# Patient Record
Sex: FEMALE | Race: WHITE | NOT HISPANIC OR LATINO | URBAN - METROPOLITAN AREA
[De-identification: names, ages, dates, MRNs, and addresses within clinical notes are randomized per-mention and may not be internally consistent; named-entity substitution may affect disease eponyms.]

---

## 2017-03-24 ENCOUNTER — OFFICE VISIT - RIVER FALLS (OUTPATIENT)
Dept: FAMILY MEDICINE | Facility: CLINIC | Age: 22
End: 2017-03-24

## 2017-03-24 ASSESSMENT — MIFFLIN-ST. JEOR: SCORE: 1488.41

## 2017-10-09 ENCOUNTER — OFFICE VISIT - RIVER FALLS (OUTPATIENT)
Dept: FAMILY MEDICINE | Facility: CLINIC | Age: 22
End: 2017-10-09

## 2017-10-09 ASSESSMENT — MIFFLIN-ST. JEOR: SCORE: 1468.45

## 2018-03-20 ENCOUNTER — OFFICE VISIT - RIVER FALLS (OUTPATIENT)
Dept: FAMILY MEDICINE | Facility: CLINIC | Age: 23
End: 2018-03-20

## 2018-03-20 ASSESSMENT — MIFFLIN-ST. JEOR: SCORE: 1466.64

## 2019-01-22 ENCOUNTER — OFFICE VISIT - RIVER FALLS (OUTPATIENT)
Dept: FAMILY MEDICINE | Facility: CLINIC | Age: 24
End: 2019-01-22

## 2019-01-22 ENCOUNTER — COMMUNICATION - RIVER FALLS (OUTPATIENT)
Dept: FAMILY MEDICINE | Facility: CLINIC | Age: 24
End: 2019-01-22

## 2019-01-22 LAB
CLUE CELLS: PRESENT
HCG UR QL: NEGATIVE
TRICHOMONAS, WET PREP: NORMAL
YEAST, WET PREP: NORMAL

## 2019-01-22 ASSESSMENT — MIFFLIN-ST. JEOR: SCORE: 1487.51

## 2019-01-24 ENCOUNTER — OFFICE VISIT - RIVER FALLS (OUTPATIENT)
Dept: FAMILY MEDICINE | Facility: CLINIC | Age: 24
End: 2019-01-24

## 2019-01-24 LAB
CHLAMYDIA TRACHOMATIS RNA, TMA - QUEST: NOT DETECTED
NEISSERIA GONORRHOEAE RNA TMA: NOT DETECTED

## 2019-05-08 ENCOUNTER — OFFICE VISIT - RIVER FALLS (OUTPATIENT)
Dept: FAMILY MEDICINE | Facility: CLINIC | Age: 24
End: 2019-05-08

## 2019-05-08 ASSESSMENT — MIFFLIN-ST. JEOR: SCORE: 1496.58

## 2019-07-18 ENCOUNTER — OFFICE VISIT - RIVER FALLS (OUTPATIENT)
Dept: FAMILY MEDICINE | Facility: CLINIC | Age: 24
End: 2019-07-18

## 2019-07-18 LAB — HCG UR QL: NEGATIVE

## 2019-07-18 ASSESSMENT — MIFFLIN-ST. JEOR: SCORE: 1394.97

## 2019-07-19 ENCOUNTER — COMMUNICATION - RIVER FALLS (OUTPATIENT)
Dept: FAMILY MEDICINE | Facility: CLINIC | Age: 24
End: 2019-07-19

## 2019-07-19 LAB
PROLACTIN: 12.1 NG/ML
TSH SERPL DL<=0.005 MIU/L-ACNC: 0.96 MIU/L

## 2019-08-08 ENCOUNTER — OFFICE VISIT - RIVER FALLS (OUTPATIENT)
Dept: FAMILY MEDICINE | Facility: CLINIC | Age: 24
End: 2019-08-08

## 2019-08-08 ASSESSMENT — MIFFLIN-ST. JEOR: SCORE: 1409.49

## 2019-09-18 ENCOUNTER — OFFICE VISIT - RIVER FALLS (OUTPATIENT)
Dept: FAMILY MEDICINE | Facility: CLINIC | Age: 24
End: 2019-09-18

## 2019-09-18 ASSESSMENT — MIFFLIN-ST. JEOR: SCORE: 1421.74

## 2019-09-27 ENCOUNTER — OFFICE VISIT - RIVER FALLS (OUTPATIENT)
Dept: FAMILY MEDICINE | Facility: CLINIC | Age: 24
End: 2019-09-27

## 2019-09-27 ASSESSMENT — MIFFLIN-ST. JEOR: SCORE: 1430.81

## 2019-11-04 ENCOUNTER — COMMUNICATION - RIVER FALLS (OUTPATIENT)
Dept: FAMILY MEDICINE | Facility: CLINIC | Age: 24
End: 2019-11-04

## 2022-02-12 VITALS
DIASTOLIC BLOOD PRESSURE: 66 MMHG | HEIGHT: 67 IN | SYSTOLIC BLOOD PRESSURE: 122 MMHG | DIASTOLIC BLOOD PRESSURE: 82 MMHG | BODY MASS INDEX: 22.6 KG/M2 | TEMPERATURE: 97.5 F | HEART RATE: 76 BPM | BODY MASS INDEX: 22.29 KG/M2 | HEART RATE: 76 BPM | HEIGHT: 67 IN | SYSTOLIC BLOOD PRESSURE: 124 MMHG | TEMPERATURE: 100.4 F | OXYGEN SATURATION: 98 % | WEIGHT: 142 LBS | WEIGHT: 144 LBS

## 2022-02-12 VITALS
BODY MASS INDEX: 25.04 KG/M2 | SYSTOLIC BLOOD PRESSURE: 107 MMHG | TEMPERATURE: 97.8 F | HEART RATE: 62 BPM | DIASTOLIC BLOOD PRESSURE: 64 MMHG | WEIGHT: 155.8 LBS | HEIGHT: 66 IN

## 2022-02-12 VITALS
SYSTOLIC BLOOD PRESSURE: 118 MMHG | TEMPERATURE: 99.2 F | DIASTOLIC BLOOD PRESSURE: 78 MMHG | WEIGHT: 160.2 LBS | HEART RATE: 74 BPM | HEIGHT: 66 IN | BODY MASS INDEX: 25.75 KG/M2

## 2022-02-12 VITALS
SYSTOLIC BLOOD PRESSURE: 120 MMHG | HEIGHT: 66 IN | WEIGHT: 160 LBS | HEART RATE: 60 BPM | BODY MASS INDEX: 25.71 KG/M2 | DIASTOLIC BLOOD PRESSURE: 74 MMHG | TEMPERATURE: 97 F

## 2022-02-12 VITALS
HEIGHT: 66 IN | TEMPERATURE: 97.9 F | HEART RATE: 80 BPM | SYSTOLIC BLOOD PRESSURE: 108 MMHG | BODY MASS INDEX: 24.98 KG/M2 | DIASTOLIC BLOOD PRESSURE: 70 MMHG | WEIGHT: 155.4 LBS

## 2022-02-12 VITALS
HEIGHT: 66 IN | BODY MASS INDEX: 22.43 KG/M2 | SYSTOLIC BLOOD PRESSURE: 120 MMHG | WEIGHT: 139.6 LBS | HEART RATE: 64 BPM | HEIGHT: 66 IN | HEART RATE: 76 BPM | DIASTOLIC BLOOD PRESSURE: 78 MMHG | DIASTOLIC BLOOD PRESSURE: 60 MMHG | BODY MASS INDEX: 22.95 KG/M2 | WEIGHT: 142.8 LBS | TEMPERATURE: 97.9 F | SYSTOLIC BLOOD PRESSURE: 102 MMHG | TEMPERATURE: 97.1 F

## 2022-02-12 VITALS
TEMPERATURE: 98.2 F | HEART RATE: 56 BPM | HEIGHT: 66 IN | SYSTOLIC BLOOD PRESSURE: 112 MMHG | DIASTOLIC BLOOD PRESSURE: 70 MMHG | WEIGHT: 162 LBS | BODY MASS INDEX: 26.03 KG/M2

## 2022-02-15 NOTE — PROGRESS NOTES
Patient:   BARBI MENDEZ            MRN: [id number]            FIN: [id number]               Age:   23 years     Sex:  Female     :  1995   Associated Diagnoses:   Asthma, mild intermittent; Breast discharge; Eustachian tube disorder   Author:   Keaton BUSTILLOS, Nadiya      Chief Complaint   2019 8:02 AM CDT    Here for f/u chest and bilat l>r Using nasal spray on occasion.        History of Present Illness   here in f/u of a couple things  1. Seen for eusta. tube dysfunction 6 weeks ago, use Atrovent, seems better. Does have intermit alternating ear pain. Had problems with a new piercing but feels that ear (R) is improved, pain now in left ear when she pulls on the ear.  Has a little sore throat. Hx of some allergens that trigger asthma otherwise never needs albuterol inhaler. She is agreeable to flu shot today, see ACT score, excellent. See AAP    2.  I saw her last summer for left nipple discharge, states that has resolved. She had normal lab work then and was asked to follow up in 6 weeks, cousin age 30 with metastatic breast cancer      Health Status   Allergies:    Allergic Reactions (Selected)  No Known Medication Allergies   Medications:  (Selected)   Prescriptions  Prescribed  Atrovent 42 mcg/inh nasal spray: 2 spray(s), nasal, qid, PRN: for nasal congestion, # 1 EA, 2 Refill(s), Type: Maintenance, Pharmacy: [facility], 2 spray(s) Nasal qid,PRN:for nasal congestion  NuvaRing 0.120 mg-0.015 mg/24 hours vaginal ring: See Instructions, Instructions: cycle continuously replacing every 4 weeks, # 3 EA, 4 Refill(s), Type: Maintenance, Pharmacy: [facility], cycle continuously replacing every 4 weeks  albuterol 90 mcg/inh inhalation aerosol: See Instructions, Instructions: 4 puffs with chamber every 4 hours as needed, # 1 EA, 1 Refill(s), Type: Maintenance, Pharmacy: [facility], 4 puffs with chamber every 4 hours as needed  chamber  "with valve and mouthpiece such as aerochamber: chamber with valve and mouthpiece such as aerochamber, See Instructions, Instructions: Use with albuterol, Supply, # 1 EA, 0 Refill(s), Type: Maintenance, Pharm[facility]: [id number], Use with albuterol   Problem list:    All Problems  History of chicken pox / SNOMED CT 239781184 / Confirmed      Histories   Past Medical History:    Active  History of chicken pox (246282376)   Family History:    Anxiety....  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  Comments:  5/8/2019 11:26 AM CDT - Consuelo JONATHAN Laina  as an infant  Extraction of wisdom tooth (SNOMED CT 461097526).  Comments:  5/15/2019 8:43 AM CDT - Maddie Warren  x4   Social History:        Alcohol Assessment            Current, Beer (12 oz), 3-5 times per week      Tobacco Assessment            Never (less than 100 in lifetime)      Substance Abuse Assessment            Never      Employment and Education Assessment            Highest education level: \"4th year [employer]\".      Home and Environment Assessment            Risks in environment: Pets/Animal exposure, Stairs.      Nutrition and Health Assessment            Diet restrictions: None.      Exercise and Physical Activity Assessment            Exercise frequency: Never.      Sexual Assessment            Sexually active: Yes.  Identifies as female, Sexual orientation: Straight or heterosexual.  History of STD:               No.  Uses condoms: Yes.  Contraceptive Use Details: Vaginal ring, Plan B.  History of sexual abuse: No.        Physical Examination   Vital Signs   9/18/2019 8:02 AM CDT Temperature Tympanic 97.5 DegF  LOW    Peripheral Pulse Rate 76 bpm    Systolic Blood Pressure 122 mmHg    Diastolic Blood Pressure 82 mmHg  HI    Mean Arterial Pressure 95 mmHg      Measurements from flowsheet : Measurements   9/18/2019 8:02 AM CDT Height Measured - Standard 67 in    Height/Length Estimated 67 in    Weight " Measured - Standard 142 lb    BSA 1.74 m2    Body Mass Index 22.24 kg/m2      General:  Alert and oriented, No acute distress.    Eye:  Normal conjunctiva.    HENT:  Tympanic membranes are clear, Oral mucosa is moist, No pharyngeal erythema, no erythem in ear canals, multiple piercings, none appear actively infected.    Neck:  Supple, Non-tender, left Breasts examined.  no masses palpable in breast. No supra nor clavicular nor axillary lymph nodes palpable. no nipple discharge  .       Impression and Plan   Diagnosis     Asthma, mild intermittent (NXN97-QB J45.20).     Breast discharge (RRR81-PA N64.52).     Eustachian tube disorder (RJT49-QU H69.90).     Course:  Improving.    Patient Instructions:       Counseled: Patient, Regarding diagnosis, Regarding treatment, Regarding medications, Verbalized understanding, reassured, no further work up at this time  flu shot today.

## 2022-02-15 NOTE — NURSING NOTE
Comprehensive Intake Entered On:  8/8/2019 8:55 AM CDT    Performed On:  8/8/2019 8:52 AM CDT by April Broussard               Summary   Chief Complaint :   Ear popping and pain.    Menstrual Status :   Menarcheal   Weight Measured :   142.8 lb(Converted to: 142 lb 13 oz, 64.77 kg)    Height Measured :   66 in(Converted to: 5 ft 6 in, 167.64 cm)    Body Mass Index :   23.05 kg/m2   Body Surface Area :   1.73 m2   Systolic Blood Pressure :   102 mmHg   Diastolic Blood Pressure :   60 mmHg   Mean Arterial Pressure :   74 mmHg   Peripheral Pulse Rate :   64 bpm   Temperature Tympanic :   97.9 DegF(Converted to: 36.6 DegC)    April Broussard - 8/8/2019 8:52 AM CDT   Meds / Allergies   (As Of: 8/8/2019 8:55:20 AM CDT)   Allergies (Active)   No Known Medication Allergies  Estimated Onset Date:   Unspecified ; Created By:   Yen Castellon CMA; Reaction Status:   Active ; Category:   Drug ; Substance:   No Known Medication Allergies ; Type:   Allergy ; Updated By:   Yen Castellon CMA; Reviewed Date:   7/18/2019 2:35 PM CDT        Medication List   (As Of: 8/8/2019 8:55:20 AM CDT)   Prescription/Discharge Order    albuterol  :   albuterol ; Status:   Prescribed ; Ordered As Mnemonic:   albuterol 90 mcg/inh inhalation aerosol ; Simple Display Line:   See Instructions, 4 puffs with chamber every 4 hours as needed, 1 EA, 1 Refill(s) ; Ordering Provider:   Nadiya Ely; Catalog Code:   albuterol ; Order Dt/Tm:   5/8/2019 11:21:05 AM          ethinyl estradiol-etonogestrel  :   ethinyl estradiol-etonogestrel ; Status:   Prescribed ; Ordered As Mnemonic:   NuvaRing 0.120 mg-0.015 mg/24 hours vaginal ring ; Simple Display Line:   See Instructions, cycle continuously replacing every 4 weeks, 3 EA, 4 Refill(s) ; Ordering Provider:   Nadiya Ely; Catalog Code:   ethinyl estradiol-etonogestrel ; Order Dt/Tm:   5/8/2019 11:20:34 AM          Miscellaneous Rx Supply  :   Miscellaneous Rx Supply ; Status:   Prescribed  ; Ordered As Mnemonic:   chamber with valve and mouthpiece such as aerochamber ; Simple Display Line:   See Instructions, Use with albuterol, 1 EA, 0 Refill(s) ; Ordering Provider:   Tamika Zuluaga MD; Catalog Code:   Miscellaneous Rx Supply ; Order Dt/Tm:   11/17/2015 3:20:20 PM

## 2022-02-15 NOTE — PROGRESS NOTES
Patient:   BARBI MENDEZ            MRN: [id number]            FIN: [id number]               Age:   22 years     Sex:  Female     :  1995   Associated Diagnoses:   Well adult   Author:   Nadiya Ely      Chief Complaint   3/20/2018 11:54 AM CDT   Pt here for annual px-- no other concerns today.        Well Adult History   Well Adult History             The patient presents for well adult exam, doing great  here for 1st pap  happy with nuvaring needs refill, will give student health supply Plan B to have in advance, educated on use  friend age 21 with double mastectomy for recent breast cancer  hx of genital HSV 1, has had no subsequent outbreaks, declines acyclovir to have in advance.  The general health status is good.  The patient's diet is described as balanced.  Exercise: none.  Associated symptoms consist of none.  Last menstrual period: regular.  Medical encounters:.  Additional pertinent history: tobacco use none.        Review of Systems   Constitutional:  Negative except as documented in history of present illness.    Eye:  Negative except as documented in history of present illness.    Respiratory:  Negative except as documented in history of present illness.    Cardiovascular:  Negative except as documented in history of present illness.    Breast:  Negative.    Gastrointestinal:  Negative except as documented in history of present illness.    Genitourinary:  Negative except as documented in history of present illness.    Gynecologic:  Negative except as documented in history of present illness.    Hematology/Lymphatics:  Negative except as documented in history of present illness.    Endocrine:  Negative except as documented in history of present illness.    Immunologic:  Negative except as documented in history of present illness.    Musculoskeletal:  Negative except as documented in history of present illness.    Integumentary:  Negative except as documented in history of present illness.     Neurologic:  Negative except as documented in history of present illness.    Psychiatric:  Negative except as documented in history of present illness.              Health Status   Allergies:    Allergic Reactions (Selected)  No Known Medication Allergies   Medications:  (Selected)   Prescriptions  Prescribed  NuvaRing 0.120 mg-0.015 mg/24 hours vaginal ring: See Instructions, Instructions: INSERT 1 RING VAGINALLY ONCE EVERY 4 WEEKS, # 3 EA, 4 Refill(s), Type: Maintenance, [facility]acy: [facility], INSERT 1 RING VAGINALLY ONCE EVERY 4 WEEKS  albuterol 90 mcg/inh inhalation aerosol: See Instructions, Instructions: 4 puffs with chamber every 4 hours as needed, # 1 EA, 1 Refill(s), Type: Maintenance, Pharmacy: [facility], 4 puffs with chamber every 4 hours as needed  chamber with valve and mouthpiece such as aerochamber: chamber with valve and mouthpiece such as aerochamber, See Instructions, Instructions: Use with albuterol, Supply, # 1 EA, 0 Refill(s), Type: Maintenance, Pharmacy: [id number], Use with albuterol   Problem list:    No problem items selected or recorded.      Histories   Past Medical History:    No active or resolved past medical history items have been selected or recorded.   Family History:    Sister: Marcella    Anxiety....     Procedure history:    No active procedure history items have been selected or recorded.      Physical Examination   Vital Signs   3/20/2018 11:54 AM CDT Temperature Tympanic 97.9 DegF    Peripheral Pulse Rate 80 bpm    Pulse Site Radial artery    HR Method Manual    Systolic Blood Pressure 108 mmHg    Diastolic Blood Pressure 70 mmHg    Mean Arterial Pressure 83 mmHg    BP Site Right arm    BP Method Manual      Measurements from flowsheet : Measurements   3/20/2018 11:54 AM CDT Height Measured - Standard 66 in    Weight Measured - Standard 155.4 lb    BSA 1.81 m2    Body Mass Index 25.08 kg/m2  HI      General:   Alert and oriented, No acute distress, vital signs stable, as noted above.    Eye:  Pupils are equal, round and reactive to light, Extraocular movements are intact, Normal conjunctiva.    HENT:  Normocephalic, Tympanic membranes are clear, Normal hearing, Oral mucosa is moist, No pharyngeal erythema.    Neck:  Supple, Non-tender, No lymphadenopathy, No thyromegaly.    Respiratory:  Lungs are clear to auscultation, Respirations are non-labored, Breath sounds are equal, Symmetrical chest wall expansion.    Cardiovascular:  Normal rate, Regular rhythm, No murmur, No edema.    Breast:  No mass, No tenderness, No discharge, Breasts examined .  No infra nor supraclavicular nodes palpable.  No axillary nodes or masses palpable.  No nipple discharge. Breasts normal throughout.    Gastrointestinal:  Soft, Non-tender, Non-distended, No organomegaly.    Genitourinary:  Normal genitalia for age and sex, No inguinal tenderness, No urethral discharge, No lesions.         Perineum: Within normal limits.         Groin/ inguinal region: Within normal limits.         Urethra: Within normal limits.         Vagina: Within normal limits.         Labia: Within normal limits.         Cervix: Within normal limits.         Uterus: Within normal limits.         Adnexa: Within normal limits.    Lymphatics:  no axillary, no supra or infraclavicular nor inguinal lymphadenopathy palpable.    Musculoskeletal:  Normal range of motion, Normal strength, No deformity, Normal gait.    Integumentary:  Warm, Dry, Pink, Intact, No rash.    Neurologic:  Alert, Oriented, Normal sensory, Normal motor function, Cranial Nerves II-XII are grossly intact.    Psychiatric:  Cooperative, Appropriate mood & affect, Normal judgment, PHQ 9/CAGE questionaire reviewed and discussed with patient,  see score.       Impression and Plan   Diagnosis     Well adult (NZY48-VN Z00.00).     Patient Instructions:       Counseled: Patient, Regarding diagnosis, Regarding  medications, Verbalized understanding, counseled on health benefits of healthy weight, regular exercise, healthy diet.    Orders     Orders (Selected)   Prescriptions  Prescribed  NuvaRing 0.120 mg-0.015 mg/24 hours vaginal ring: See Instructions, Instructions: INSERT 1 RING VAGINALLY ONCE EVERY 4 WEEKS, # 3 EA, 4 Refill(s), Type: Maintenance, Pharmacy: [facility], INSERT 1 RING VAGINALLY ONCE EVERY 4 WEEKS.

## 2022-02-15 NOTE — PROCEDURES
Accession Number:       [id number]  CLINICAL INFORMATION::     None given  LMP::     03/03/2018  PREV. PAP::     NONE GIVEN  PREV. BX::     NONE GIVEN  SOURCE::     None given  STATEMENT OF ADEQUACY::     Satisfactory for evaluation. Endocervical/transformation zone component present.  INTERPRETATION/RESULT::     Negative for intraepithelial lesion or malignancy.  COMMENT::     This Pap test has been evaluated with computer assisted technology.  CYTOTECHNOLOGIST::     DONNIE FOURNIER(ASCP) CT Screening location: [place] [address]  COMMENT:     See comment       EXPLANATORY NOTE:         The Pap is a screening test for cervical cancer. It is       not a diagnostic test and is subject to false negative       and false positive results. It is most reliable when a       satisfactory sample, regularly obtained, is submitted       with relevant clinical findings and history, and when       the Pap result is evaluated along with historic and       current clinical information.  CHLAMYDIA TRACHOMATIS RNA, TMA:     DETECTED         A positive CT or NG Nucleic Acid Amplification Test       (NAAT) result should be interpreted in conjunction       with other laboratory and clinical data available to       the clinician. If clinically indicated, further       testing can be performed on the same sample using       an alternate molecular target. To order alternate       target test use 38930 (C. trachomatis) or       24255 (N. gonorrhoeae).  NEISSERIA GONORRHOEAE RNA, TMA:     NOT DETECTED  COMMENT:     See comment       This test was performed using the APTIMA COMBO2 Assay       ([employer]         The analytical performance characteristics of this       assay, when used to test SurePath specimens have       been determined by [facility].

## 2022-02-15 NOTE — TELEPHONE ENCOUNTER
---------------------  From: Allyssa Soni LPN (Phone Messages Pool ([id number]_[facility]))   To: NCB Message Pool ([id number]_[facility]);     Sent: 1/22/2019 2:16:36 PM CST  Subject: NuvaRing        Phone Message    PCP:   HERMAN      Time of Call:  1406       Person Calling:  Angela SILVA   Phone number:      Returned call at:     Note:   They need more clarification on Rx.  They are not sure how to fill.  How often is she to continuously use the ring before changing?  Please advise.     Last office visit and reason:  01/22/2019 FP plus---------------------  From: Laina Staton ([facility] Message Pool ([id number]_[facility]))   To: Nadiya Ely;     Sent: 1/23/2019 8:43:52 AM CST  Subject: FW: NuvaRing---------------------  From: Nadiya Ely   To: [facility] Message Pool ([id number]_[facility]);     Sent: 1/23/2019 8:54:17 AM CST  Subject: RE: NuvaRing      I sent rx clarifying to cycle every 3 weeks.Kb at [facility] left message stating that they are waiting for more specific directions on pt Nuvaring.  Called to Kb and informed that provider sent a new rx over this morning.  Kb looked further in his papers and found it.

## 2022-02-15 NOTE — PROGRESS NOTES
Patient:   BARBI MENDEZ            MRN: [id number]            FIN: [id number]               Age:   21 years     Sex:  Female     :  1995   Associated Diagnoses:   Family planning   Author:   Nadiya Ely      Chief Complaint   10/9/2017 4:20 PM CDT    refill nuvaring        Interval History   Concerning symptoms as listed in Chief Complaint above discussed and confirmed with patient  Has used since age 18 years, cycles 2 rings continuously then has menses when leaves ring out 1 week  Turns 22 years next month, will schdule pap within 6 months  no current or recent partner  Agreeable to tdap, declines flu shot         Health Status   Allergies:    Allergic Reactions (Selected)  No Known Medication Allergies   Medications:  (Selected)   Prescriptions  Prescribed  NuvaRing 0.120 mg-0.015 mg/24 hours vaginal ring: See Instructions, Instructions: INSERT 1 RING VAGINALLY ONCE EVERY 4 WEEKS, # 3 EA, 1 Refill(s), Type: Maintenance, Pharmacy: [facility], INSERT 1 RING VAGINALLY ONCE EVERY 4 WEEKS  albuterol 90 mcg/inh inhalation aerosol: See Instructions, Instructions: 4 puffs with chamber every 4 hours as needed, # 1 EA, 1 Refill(s), Type: Maintenance, Pharmacy: [facility], 4 puffs with chamber every 4 hours as needed  chamber with valve and mouthpiece such as aerochamber: chamber with valve and mouthpiece such as aerochamber, See Instructions, Instructions: Use with albuterol, Supply, # 1 EA, 0 Refill(s), Type: Maintenance, Pharmacy: [id number], Use with albuterol   Problem list:    No problem items selected or recorded.      Histories   Past Medical History:    No active or resolved past medical history items have been selected or recorded.   Family History:    Anxiety....  Sister (Marcella)     Procedure history:    No active procedure history items have been selected or recorded.   Social History:        Alcohol Assessment: Denies Alcohol  Use            Never      Tobacco Assessment            Never smoker      Substance Abuse Assessment: Denies Substance Abuse            Never      Exercise and Physical Activity Assessment: Does not exercise      Sexual Assessment            Sexually active: Yes.  Sexual orientation: Heterosexual.        Physical Examination   Vital Signs   10/9/2017 4:20 PM CDT Temperature Tympanic 97.8 DegF  LOW    Peripheral Pulse Rate 62 bpm    Pulse Site Brachial artery    HR Method Electronic    Systolic Blood Pressure 107 mmHg    Diastolic Blood Pressure 64 mmHg    Mean Arterial Pressure 78 mmHg    BP Site Right arm    BP Method Electronic      Measurements from flowsheet : Measurements   10/9/2017 4:20 PM CDT Height Measured - Standard 66 in    Weight Measured - Standard 155.8 lb    BSA 1.81 m2    Body Mass Index 25.14 kg/m2      General:  Alert and oriented, No acute distress.       Impression and Plan   Diagnosis     Family planning (BWA46-HD Z30.09).     Patient Instructions:       Counseled: Patient, Regarding diagnosis, Regarding treatment, Regarding medications, Verbalized understanding, 10minutes spent with this pt, all on counseling regarding the use/risks/benefits of various birthcontrol methods and when first pap is due.    Orders     Orders (Selected)   Prescriptions  Prescribed  NuvaRing 0.120 mg-0.015 mg/24 hours vaginal ring: See Instructions, Instructions: INSERT 1 RING VAGINALLY ONCE EVERY 4 WEEKS, # 3 EA, 1 Refill(s), Type: Maintenance, [facility]acy: [facility], INSERT 1 RING VAGINALLY ONCE EVERY 4 WEEKS.

## 2022-02-15 NOTE — PROGRESS NOTES
Patient:   BARBI MENDEZ            MRN: [id number]            FIN: [id number]               Age:   23 years     Sex:  Female     :  1995   Associated Diagnoses:   Family planning advice; Irregular menses; Screen for STD (sexually transmitted disease)   Author:   Nadiya Ely      Chief Complaint   2019 1:00 PM CST    c/o irregular menses -took out NuvaRing last wednesday like normal and then on Thursday when she started her menses she states there was a big blood clot and then normal after that. Menses ended yesterday.      History of Present Illness   Chief complaint and concerns discussed with patient and confirmed as above.   describes the clot as fleshy, size of an egg  bleeding was lighter than normal  due to put nuvaring in tomorrow  she uses it for 3 weeks, discards and reinserts immediately then discards and gets menses 1 week later   no cramping or pain  denies foreign body insertions other than tampons and a vibrator she uses sometimes      Health Status   Allergies:    Allergic Reactions (Selected)  No Known Medication Allergies   Medications:  (Selected)   Prescriptions  Prescribed  NuvaRing 0.120 mg-0.015 mg/24 hours vaginal ring: See Instructions, Instructions: cycle continuously, will  need extra rings, # 4 EA, 6 Refill(s), Type: Maintenance, Pharmacy: [facility], [facility], cycle continuously, will  need extra rings  albuterol 90 mcg/inh inhalation aerosol: See Instructions, Instructions: 4 puffs with chamber every 4 hours as needed, # 1 EA, 1 Refill(s), Type: Maintenance, Pharmacy: [facility], [facility]1983, 4 puffs with chamber every 4 hours as needed  chamber with valve and mouthpiece such as aerochamber: chamber with valve and mouthpiece such as aerochamber, See Instructions, Instructions: Use with albuterol, Supply, # 1 EA, 0 Refill(s), Type: Maintenance, [id number], Use with albuterol   Problem list:    No problem items  selected or recorded.      Histories   Past Medical History:    No active or resolved past medical history items have been selected or recorded.   Family History:    Anxiety....  Sister (Marcella)     Procedure history:    No active procedure history items have been selected or recorded.   Social History:        Alcohol Assessment            Current, 5-7 times per week, 2 drinks/episode average.            Never      Tobacco Assessment            Never smoker      Substance Abuse Assessment            Never      Nutrition and Health Assessment            Type of diet: Regular.      Exercise and Physical Activity Assessment            Exercise frequency: Daily.  Exercise type: Walking.      Sexual Assessment            Sexually active: Yes.  Sexual orientation: Straight or heterosexual.  Uses condoms: Yes.  Contraceptive Use               Details: Vaginal ring.      Physical Examination   Vital Signs   1/22/2019 1:00 PM CST Temperature Tympanic 97.0 DegF  LOW    Peripheral Pulse Rate 60 bpm    Pulse Site Radial artery    HR Method Manual    Systolic Blood Pressure 120 mmHg    Diastolic Blood Pressure 74 mmHg    Mean Arterial Pressure 89 mmHg    BP Site Right arm    BP Method Manual      Measurements from flowsheet : Measurements   1/22/2019 1:00 PM CST Height Measured - Standard 66 in    Weight Measured - Standard 160.0 lb    BSA 1.84 m2    Body Mass Index 25.82 kg/m2  HI      General:  Alert and oriented.    Gastrointestinal:  Soft, Non-tender, pelvic exam reveals no inguinal lymph nodes palpable, no external lesions, no odor but there is slight green /yellow DC in vault. Introitus normal with vault with normal rugae, cervix visualized and clear. No cervical motion tenderness, no adnexal tenderness or masses, no foreign body  .       Review / Management   Results review:  Lab results   1/22/2019 1:37 PM CST U HCG POC NEGATIVE   1/22/2019 1:34 PM CST Wet Prep Yeast None Seen    Wet Prep Trichomonas None Seen    Wet Prep  Clue Cells Present   .       Impression and Plan   Diagnosis     Family planning advice (HWX69-TF Z30.09).     Irregular menses (FMT78-VO N92.6).     Screen for STD (sexually transmitted disease) (YWI11-JJ Z11.3).     Patient Instructions:       Counseled: Patient, Regarding diagnosis, Regarding treatment, Regarding medications, Verbalized understanding.    Orders     Orders (Selected)   Outpatient Orders  Ordered (In Transit)  Chlamydia/Neisseria gonorrhoeae RNA, TMA* ([facility]): Specimen Type: Swab, Collection Date: 01/22/19 13:18:00 CST.     25 min in counseling and coordination of care  condoms alway  proper nuvaring use  sti testing.

## 2022-02-15 NOTE — NURSING NOTE
Comprehensive Intake Entered On:  5/8/2019 11:04 AM CDT    Performed On:  5/8/2019 10:59 AM CDT by Laina Staton               Summary   Chief Complaint :   Pt here for annual px   Last Menstrual Period :   3/22/2019 CDT   Menstrual Status :   Menarcheal   Weight Measured :   162.0 lb(Converted to: 162 lb 0 oz, 73.48 kg)    Height Measured :   66 in(Converted to: 5 ft 6 in, 167.64 cm)    Body Mass Index :   26.14 kg/m2 (HI)    Body Surface Area :   1.85 m2   Systolic Blood Pressure :   112 mmHg   Diastolic Blood Pressure :   70 mmHg   Mean Arterial Pressure :   84 mmHg   Peripheral Pulse Rate :   56 bpm (LOW)    BP Site :   Right arm   Pulse Site :   Radial artery   BP Method :   Manual   HR Method :   Manual   Temperature Tympanic :   98.2 DegF(Converted to: 36.8 DegC)    Laina Staton - 5/8/2019 10:59 AM CDT   Health Status   Allergies Verified? :   Yes   Medication History Verified? :   Yes   Medical History Verified? :   Yes   Pre-Visit Planning Status :   Completed   Tobacco Use? :   Never smoker   Laina Staton - 5/8/2019 10:59 AM CDT   Consents   Consent for Immunization Exchange :   Consent Granted   Consent for Immunizations to Providers :   Consent Granted   Laina Staton - 5/8/2019 10:59 AM CDT   Problems   (As Of: 5/8/2019 11:04:54 AM CDT)   Meds / Allergies   (As Of: 5/8/2019 11:04:54 AM CDT)   Allergies (Active)   No Known Medication Allergies  Estimated Onset Date:   Unspecified ; Created By:   Yen Castellon CMA; Reaction Status:   Active ; Category:   Drug ; Substance:   No Known Medication Allergies ; Type:   Allergy ; Updated By:   Yen Castellon CMA; Reviewed Date:   5/8/2019 11:04 AM CDT        Medication List   (As Of: 5/8/2019 11:04:54 AM CDT)   Prescription/Discharge Order    ethinyl estradiol-etonogestrel  :   ethinyl estradiol-etonogestrel ; Status:   Prescribed ; Ordered As Mnemonic:   NuvaRing 0.120 mg-0.015 mg/24 hours vaginal ring ; Simple Display Line:   See  Instructions, cycle continuously replacing every 4 weeks, 3 EA, 3 Refill(s) ; Ordering Provider:   Nadiya Ely; Catalog Code:   ethinyl estradiol-etonogestrel ; Order Dt/Tm:   2/1/2019 11:02:22 AM          albuterol  :   albuterol ; Status:   Prescribed ; Ordered As Mnemonic:   albuterol 90 mcg/inh inhalation aerosol ; Simple Display Line:   See Instructions, 4 puffs with chamber every 4 hours as needed, 1 EA, 1 Refill(s) ; Ordering Provider:   Nadiya Ely; Catalog Code:   albuterol ; Order Dt/Tm:   3/24/2017 11:29:08 AM          Miscellaneous Rx Supply  :   Miscellaneous Rx Supply ; Status:   Prescribed ; Ordered As Mnemonic:   chamber with valve and mouthpiece such as aerochamber ; Simple Display Line:   See Instructions, Use with albuterol, 1 EA, 0 Refill(s) ; Ordering Provider:   Tamika Zuluaga MD; Catalog Code:   Miscellaneous Rx Supply ; Order Dt/Tm:   11/17/2015 3:20:20 PM

## 2022-02-15 NOTE — PROGRESS NOTES
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  The general health status is good.  The patient's diet is described as balanced.  Exercise: routine.  Associated symptoms consist of none.  Medical encounters:.  Additional pertinent history: tobacco use none.        Review of Systems   Constitutional:  Negative except as documented in history of present illness.    Eye:  Negative except as documented in history of present illness.    Respiratory:  Negative except as documented in history of present illness.    Cardiovascular:  Negative except as documented in history of present illness.    Breast:  Negative.    Gastrointestinal:  Negative except as documented in history of present illness.    Genitourinary:  Negative except as documented in history of present illness.    Gynecologic:  Negative except as documented in history of present illness.    Hematology/Lymphatics:  Negative except as documented in history of present illness.    Endocrine:  Negative except as documented in history of present illness.    Immunologic:  Negative except as documented in history of present illness.    Musculoskeletal:  Negative except as documented in history of present illness.    Integumentary:  Negative except as documented in history  of present illness.    Neurologic:  Negative except as documented in history of present illness.    Psychiatric:  Negative except as documented in history of present illness.              Health Status   Allergies:    Allergic Reactions (Selected)  No Known Medication Allergies   Medications:  (Selected)   Prescriptions  Prescribed  NuvaRing 0.120 mg-0.015 mg/24 hours vaginal ring: See Instructions, Instructions: cycle continuously replacing every 4 weeks, # 3 EA, 4 Refill(s), Type: Maintenance, Pharmacy: [facility], cycle continuously replacing every 4 weeks  albuterol 90 mcg/inh inhalation aerosol: See Instructions, Instructions: 4 puffs with chamber every 4 hours as needed, # 1 EA, 1 Refill(s), Type: Maintenance, Pharmacy: [facility], 4 puffs with chamber every 4 hours as needed  chamber with valve and mouthpiece such as aerochamber: chamber with valve and mouthpiece such as aerochamber, See Instructions, Instructions: Use with albuterol, Supply, # 1 EA, 0 Refill(s), Type: Maintenance, Pharmacy: [id number], Use with albuterol   Problem list:    No problem items selected or recorded.      Histories   Past Medical History:    No active or resolved past medical history items have been selected or recorded.   Family History:    Sister: Marcella Puckett....     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  Comments:  5/8/2019 11:26 AM CDT - Consuelo CASTILLO Laina  as an infant   Social History:        Alcohol Assessment            Current, 5-7 times per week, 2 drinks/episode average.            Never      Tobacco Assessment            Never smoker      Substance Abuse Assessment            Never      Nutrition and Health Assessment            Type of diet: Regular.      Exercise and Physical Activity Assessment            Exercise frequency: Daily.  Exercise type: Walking.      Sexual Assessment            Sexually active: Yes.   Sexual orientation: Straight or heterosexual.  Uses condoms: Yes.  Contraceptive Use               Details: Vaginal ring.      Physical Examination   Vital Signs   5/8/2019 10:59 AM CDT Temperature Tympanic 98.2 DegF    Peripheral Pulse Rate 56 bpm  LOW    Pulse Site Radial artery    HR Method Manual    Systolic Blood Pressure 112 mmHg    Diastolic Blood Pressure 70 mmHg    Mean Arterial Pressure 84 mmHg    BP Site Right arm    BP Method Manual      Measurements from flowsheet : Measurements   5/8/2019 10:59 AM CDT Height Measured - Standard 66 in    Weight Measured - Standard 162.0 lb    BSA 1.85 m2    Body Mass Index 26.14 kg/m2  HI      General:  Alert and oriented, No acute distress, vital signs stable, as noted above.    Eye:  Pupils are equal, round and reactive to light, Extraocular movements are intact, Normal conjunctiva.    HENT:  Normocephalic, Tympanic membranes are clear, Normal hearing, Oral mucosa is moist, No pharyngeal erythema.    Neck:  Supple, Non-tender, No lymphadenopathy, No thyromegaly.    Respiratory:  Lungs are clear to auscultation, Respirations are non-labored, Breath sounds are equal, Symmetrical chest wall expansion.    Cardiovascular:  Normal rate, Regular rhythm, No murmur, No edema.    Gastrointestinal:  Soft, Non-tender, Non-distended, No organomegaly.    Musculoskeletal:  Normal range of motion, Normal strength, No deformity, Normal gait.    Integumentary:  Warm, Dry, Pink, Intact, No rash.    Neurologic:  Alert, Oriented, Normal sensory, Normal motor function, Cranial Nerves II-XII are grossly intact.    Psychiatric:  Cooperative, Appropriate mood & affect, Normal judgment, PHQ 9/CAGE questionaire reviewed and discussed with patient,  see score.       Impression and Plan   Diagnosis     Family planning advice (EPZ29-YB Z30.09).     Plantar fasciitis (VYF13-KB M72.2).     Well adult (NXR15-RE Z00.00).     Patient Instructions:       Counseled: Patient, Regarding diagnosis,  Regarding medications, Verbalized understanding, counseled on health benefits of healthy weight, regular exercise, healthy diet, educated on use/risk/benefit IUD  including but not limited to pain, migraition, implantation, infection, uterine perforation  she will stay on nuvaring till insertion completed and check insurance coverage  she has not CI to use.    Orders     Orders (Selected)   Prescriptions  Prescribed  NuvaRing 0.120 mg-0.015 mg/24 hours vaginal ring: See Instructions, Instructions: cycle continuously replacing every 4 weeks, # 3 EA, 4 Refill(s), Type: Maintenance, Pharmacy: [facility], cycle continuously replacing every 4 weeks.

## 2022-02-15 NOTE — LETTER
(Inserted Image. Unable to display)     August 19, 2019      BARBI MENDEZ  [address], [place] [id number]          Dear BARBI,      Thank you for selecting [facility] ([facility]viously [facility] for your healthcare needs.      Our records indicate you are due for the following services:     Follow-up office visit.      To schedule an appointment or if you have further questions, please contact your primary clinic:   [facility]       [phone]   [facility]       [phone]              [facility]     [phone]      Powered by [employer] and [employer]    Sincerely,    JOSEFA CarmonaNP

## 2022-02-15 NOTE — PROGRESS NOTES
Patient:   BARBI MENDEZ            MRN: [id number]            FIN: [id number]               Age:   23 years     Sex:  Female     :  1995   Associated Diagnoses:   None   Author:   Nadiya Ely      Chief Complaint   2019 2:32 PM CDT    c/o jaw pain x1 year, worse in the past 2 months, itchy scalp, and L breast discharge x1 week.        History of Present Illness     Here for eval of a few things  1.  left breast discharge noted 1 week ago. States it was on her clothes and then she realized she could express from the left breast, not the right.  white and clear. Breasts are a little sore today but she is on her menses. She uses nuvaring and cycles continuously, there has been a change in hormone status as she was inserting/removing every 3 weeks and now she leaves ring in place 4 weeks then replaces. She will leave out for 1 week about every 3 months. She is happy with this routine. She has a maternal cousin dx with metastatic breast cancer at about age 30 years old.    2.  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 She has been using the 'dry' shampoo and trying to wash her hair less    3. right jaw pain for 6 weeks. started after  she was fooling around with a friend and opened mouth wide, denies any traumatic  injury. It is sore now almost every day. she is afraid it will hurt more if she opens wide. has been eating Fruit by [employer] but not alot of chewy foods. no meds or ice for this. The jaw never locks up. She has been told in past she grinds her teeth/clenches      Review of Systems   Constitutional:  Negative.              Health Status   Allergies:    Allergic Reactions (Selected)  No Known Medication Allergies   Medications:  (Selected)   Prescriptions  Prescribed  NuvaRing 0.120 mg-0.015 mg/24 hours vaginal ring: See Instructions, Instructions: cycle continuously replacing every 4 weeks, # 3 EA, 4 Refill(s), Type: Maintenance, [facility]  "continuously replacing every 4 weeks  albuterol 90 mcg/inh inhalation aerosol: See Instructions, Instructions: 4 puffs with chamber every 4 hours as needed, # 1 EA, 1 Refill(s), Type: Maintenance, Pharmacy: [facility], 4 puffs with chamber every 4 hours as needed  chamber with valve and mouthpiece such as aerochamber: chamber with valve and mouthpiece such as aerochamber, See Instructions, Instructions: Use with albuterol, Supply, # 1 EA, 0 Refill(s), Type: Maintenance, [id number], Use with albuterol  predniSONE 20 mg oral tablet: = 1 tab(s) ( 20 mg ), Oral, daily, x 10 day(s), Instructions: with food or milk in the morning, # 10 tab(s), 0 Refill(s), Type: Acute, Pharmacy: [facility] [id number] [facility], 1 tab(s) Oral daily,x10 day(s),Instr:with food or milk in the morning   Problem list:    All Problems  History of chicken pox / SNOMED CT 766311691 / Confirmed      Histories   Past Medical History:    Active  History of chicken pox (244665591)   Family History:    Anxiety....  Sister (Marcella)     Procedure history:    Duodenal stenosis (SNOMED CT 9140809972).  Comments:  5/8/2019 11:26 AM CDT - Laina Staton  as an infant  Extraction of wisdom tooth (SNOMED CT 076284854).  Comments:  5/15/2019 8:43 AM CDT - Maddie Warren  x4   Social History:        Alcohol Assessment            Current, Beer (12 oz), 3-5 times per week      Tobacco Assessment            Never (less than 100 in lifetime)      Substance Abuse Assessment            Never      Employment and Education Assessment            Highest education level: \"4th year [employer]\".      Home and Environment Assessment            Risks in environment: Pets/Animal exposure, Stairs.      Nutrition and Health Assessment            Diet restrictions: None.      Exercise and Physical Activity Assessment            Exercise frequency: Never.      Sexual Assessment            Sexually active: Yes.  Identifies as female, Sexual " orientation: Straight or heterosexual.  History of STD:               No.  Uses condoms: Yes.  Contraceptive Use Details: Vaginal ring, Plan B.  History of sexual abuse: No.      Physical Examination   Vital Signs   7/18/2019 2:32 PM CDT Temperature Tympanic 97.1 DegF  LOW    Peripheral Pulse Rate 76 bpm    Systolic Blood Pressure 120 mmHg    Diastolic Blood Pressure 78 mmHg    Mean Arterial Pressure 92 mmHg      Measurements from flowsheet : Measurements   7/18/2019 2:32 PM CDT Height Measured - Standard 66 in    Weight Measured - Standard 139.6 lb    BSA 1.72 m2    Body Mass Index 22.53 kg/m2      General:  Alert and oriented, No acute distress.    Eye:  Normal conjunctiva.    HENT:  Tympanic membranes are clear, Oral mucosa is moist, No pharyngeal erythema, No sinus tenderness, she can open and close her mouth normally in appearance, she feels she is not fully opening. There is no click over the TMJ on either side, she has tenderness on the right, scalp is examined, just a few flakes of dry skin, no lice or mites noted.    Respiratory:  Lungs are clear to auscultation, Respirations are non-labored.    Cardiovascular:  Normal rate, Regular rhythm.    Lymphatics:  Breasts examined.  no masses palpable in breast although there are areas in both breasts of increased fibrous tissue No supra nor clavicular nor axillary lymph nodes palpable.  She is able to express clear/white scan discharge from the left nipple.  .    Integumentary:  Warm, Dry, Pink.    Neurologic:  Alert, Oriented.       Review / Management   Results review:  Lab results: 7/18/2019 3:13 PM CDT    U HCG POC                 NEGATIVE  .       Impression and Plan   Patient Instructions:       Counseled: Patient, Regarding diagnosis, Regarding treatment, Regarding medications, Verbalized understanding, Counseled on symptomatic management. Return to clinic for re evaluation if worsening, simply not improving, or failure to resolve.   .    Orders     Orders  (Selected)   Outpatient Orders  Ordered (In Transit)  Prolactin* ([facility]): Specimen Type: Serum, Collection Date: 07/18/19 14:58:00 CDT  TSH* ([facility]): Specimen Type: Serum, Collection Date: 07/18/19 14:58:00 CDT.         -reassured itchy scalp is likely from the dry shampoo, she will remedy  -Jaw pain could be tendonitis, advised course prednisone with ice tid for 10 min and f/u with Dentist. I suspect if she has clenching or grinding that is contributing to the problem  -will initiate work up for breast discharge, could be from her hormonal contraception but she has cousin age 30 years with breast cancer.

## 2022-02-15 NOTE — PROGRESS NOTES
Chief Complaint    Pt c/o cough from flu shot.  History of Present Illness      Chief complaint as above reviewed and confirmed with patient.  Pt presents to the clinic with concerns re: fever, cold and cough.  Has had a scratchy throat for 5 days, increased cough and mucus x 2 days and fever with chills noted today.  No nausea/vomiting. does have rhinorrhea, congestion.  does not have a ST, just a little scratchy.  eating and drinking well.  NO vomiting or diarhea. No rash. Has not taken medications for her sx.  Concerned her fever and uri were from flu shot 1.5 weeks ago.  Has hx of allergies. has not needed her albuterol. no wheezing or sob. no chest pain.  Review of Systems      Review of systems is negative with the exception of those noted in HPI          Physical Exam   Vitals & Measurements    T: 110.4   F (Tympanic)  HR: 76(Peripheral)  BP: 124/66  SpO2: 98%     HT: 67 in  HT: 67 in  WT: 144 lb  BMI: 22.55           Vitals as above per nursing documentation           Constitutional : nad appears well          Ears: ears patent B, TMS intact, noninjected           Nose: nasal mucosa is non-ededmatous. no discharge           Throat: pharynx is nonerythematous, no tonsillar hypertrophy, no exudate           Neck: neck supple, no adenopathy, no thyromegaly, no rigidity           Lungs: lungs CTA', no Wheezes, rhonchi or rales           Heart: heart RRR, nl S1, S2 no murmur           skin:  No rashes              Assessment/Plan       1. Acute URI (J06.9)         recommend Tessalon for cough, as she states cough is keeping her up at night.  No wheezing, sob or need for albuterol yet however should use if any sob or difficulty breathing, tight congested cough.  Pt agreeable.  .rtc for persistent cough, chest pain, sob/wheezing  not relieved by albuterol, or fevers greater than 72 hours.       Orders:         benzonatate, = 2 cap(s) ( 200 mg ), Oral, tid, x 7 day(s), # 42 cap(s), 1 Refill(s), Type: Acute,  "Pharmacy: [facility], 2 cap(s) Oral tid,x7 day(s), (Ordered)  Patient Information     Name:BARBI MENDEZ      Address:      [address] [place] [id number]     Sex:Female     YOB: 1995     [phone]     Emergency Contact:SILVIO MENDEZ     MRN:[id number]     FIN:[id number]     Location:[facility]     Date of Service:09/27/2019      Primary Care Physician:       Nadiya Ely, [phone]      Attending Physician:       Enid Page PA-C, [phone]  Problem List/Past Medical History    Ongoing     Asthma, mild intermittent     History of chicken pox    Historical     No qualifying data  Procedure/Surgical History     Duodenal stenosis            Comments: as an infant.     Extraction of wisdom tooth            Comments: x4.  Medications    albuterol 90 mcg/inh inhalation aerosol, See Instructions, 1 refills    Atrovent 42 mcg/inh nasal spray, 2 spray(s), Nasal, qid, PRN, 2 refills    chamber with valve and mouthpiece such as aerochamber, See Instructions    NuvaRing 0.120 mg-0.015 mg/24 hours vaginal ring, See Instructions, 4 refills    Tessalon Perles 100 mg oral capsule, 200 mg= 2 cap(s), Oral, tid, 1 refills  Allergies    No Known Medication Allergies  Social History    Smoking Status - 09/27/2019     Current some day smoker     Alcohol      Current, Beer (12 oz), 3-5 times per week, 05/17/2019     Employment/School      Highest education level: \"4th year [employer]\"., 05/15/2019     Exercise      Exercise frequency: Never., 05/15/2019     Home/Environment      Risks in environment: Pets/Animal exposure, Stairs., 05/15/2019     Nutrition/Health      Diet restrictions: None., 05/15/2019     Sexual      Sexually active: Yes. Identifies as female, Sexual orientation: Straight or heterosexual. History of STD: No. Uses condoms: Yes. Contraceptive Use Details: Vaginal ring, Plan B. History of sexual abuse: No., 05/15/2019     Substance Abuse      " Never, 05/15/2019     Tobacco      Never (less than 100 in lifetime), 05/15/2019  Family History    Anxiety....: Sister.  Immunizations      Vaccine Date Status      influenza virus vaccine, inactivated 09/18/2019 Given      tetanus/diphth/pertuss (Tdap) adult/adol 10/09/2017 Given      meningococcal conjugate vaccine 10/17/2014 Recorded      influenza virus vaccine, inactivated 10/22/2013 Recorded      influenza virus vaccine, inactivated 10/30/2012 Recorded      human papillomavirus vaccine 01/06/2012 Recorded      human papillomavirus vaccine 08/22/2011 Recorded      varicella 06/20/2011 Recorded      human papillomavirus vaccine 06/20/2011 Recorded      meningococcal conjugate vaccine 08/20/2008 Recorded      tetanus/diphth/pertuss (Tdap) adult/adol 08/20/2008 Recorded      DTaP 08/01/2002 Recorded      MMR (measles/mumps/rubella) 08/01/2002 Recorded      IPV 08/01/2002 Recorded      varicella 12/14/2001 Recorded      Hib (PRP-T) 03/23/1998 Recorded      MMR (measles/mumps/rubella) 03/23/1998 Recorded      DTaP 02/20/1998 Recorded      Hep B 02/20/1998 Recorded      Hep B 09/24/1997 Recorded      OPV 06/26/1997 Recorded      DTaP 06/26/1997 Recorded      Hib (PRP-T) 06/26/1997 Recorded      OPV 04/14/1997 Recorded      DTaP 04/14/1997 Recorded      Hib (PRP-T) 04/14/1997 Recorded      OPV 01/29/1997 Recorded      DTaP 01/29/1997 Recorded      Hep B 01/29/1997 Recorded      Hib (PRP-T) 01/29/1997 Recorded      Hep B 1995 Recorded  Lab Results       Lab Results (Last 4 results within 90 days)        U HCG POC: NEGATIVE [NEGATIVE  - NEGATIVE] (07/18/19 15:13:00)       TSH: 0.96 mIU/L (07/18/19 15:11:00)       Prolactin Level: 12.1 ng/mL (07/18/19 15:11:00)  above temperature should read 100.4 F

## 2022-02-15 NOTE — NURSING NOTE
Depression Screening Entered On:  5/17/2019 9:23 AM CDT    Performed On:  5/8/2019 9:21 AM CDT by Tony April               Depression Screening   Little Interest - Pleasure in Activities :   More than half the days   Feeling Down, Depressed, Hopeless :   Several days   Initial Depression Screen Score :   3    Trouble Falling or Staying Asleep :   More than half the days   Feeling Tired or Little Energy :   Several days   Poor Appetite or Overeating :   Not at all   Feeling Bad About Yourself :   Not at all   Trouble Concentrating :   Several days   Moving or Speaking Slowly :   Not at all   Thoughts Better Off Dead or Hurting Self :   Not at all   Detailed Depression Screen Score :   4    Total Depression Screen Score :   7    PHIL Difficulty with Work, Home, Others :   Not difficult at all   Tony April - 5/17/2019 9:21 AM CDT

## 2022-02-15 NOTE — LETTER
(Inserted Image. Unable to display)   January 24, 2019      CARLA MENDEZ      [address] [place] [phone]        Dear CARLA,    Thank you for selecting [facility] for your healthcare needs.  Below you will find the results of the recent tests done at our clinic.     These tests are negative, Carla.      Result Name Current Result Reference Range   Chlamydia RNA  NOT DETECTED 1/22/2019 NOT DETECTED -    Neisseria gonorrhoeae RNA  NOT DETECTED 1/22/2019 NOT DETECTED -        Please contact me or my assistant at [phone] if you have any questions or concerns.     Sincerely,        TAHIRA Liu-NP  Family Nurse Practitioner      What do your labs mean?  Below is a glossary of commonly ordered labs:  LDL   Bad Cholesterol   HDL   Good Cholesterol  AST/ALT   Liver Function   Cr/Creatinine   Kidney Function  Microalbumin   Kidney Function  BUN   Kidney Function  PSA   Prostate    TSH   Thyroid Hormone  HgbA1c   Diabetes Test   Hgb (Hemoglobin)   Red Blood Cells  WBC   White Blood Cell Count

## 2022-02-15 NOTE — TELEPHONE ENCOUNTER
---------------------  From: Nadiya Ely   To: BARBI MENDEZ    Sent: 7/19/2019 2:30:51 PM CDT  Subject: Patient Message - Results Notification        Abdelrahman Aguirre,  You were successful in setting up the patient portal for communication!   We just spoke on the phone. Just to recap, the labs are normal.   I want to give this a little time to self resolve as it may be related to some hormone adjustments with the Nuva ring.  In 3 weeks, return to see me and I will repeat the exam.  In the  meantime, do not manually check to see if you have any discharge from the nipples. Thank you.    MENA Liu    Results:  Date Result Name Value   7/18/2019 3:11 PM TSH 0.96 mIU/L   7/18/2019 3:11 PM Prolactin Level 12.1 ng/mL

## 2022-02-15 NOTE — PROGRESS NOTES
Patient:   BARBI MENDEZ            MRN: [id number]            FIN: [id number]               Age:   23 years     Sex:  Female     :  1995   Associated Diagnoses:   Eustachian tube dysfunction   Author:   Craig Aguilera PA-C      Chief Complaint   2019 8:52 AM CDT     Ear popping and pain.      History of Present Illness   Chief complaint and symptoms noted above and confirmed with patient   right ear popping and some pain for about a month, nasal congestion           Review of Systems   Constitutional:  Negative.    Ear/Nose/Mouth/Throat:  Nasal congestion.         Ear pain: Right.    Respiratory:  Negative.       Health Status   Allergies:    Allergic Reactions (All)  No Known Medication Allergies   Medications:  (Selected)   Prescriptions  Prescribed  NuvaRing 0.120 mg-0.015 mg/24 hours vaginal ring: See Instructions, Instructions: cycle continuously replacing every 4 weeks, # 3 EA, 4 Refill(s), Type: Maintenance, [facility], cycle continuously replacing every 4 weeks  albuterol 90 mcg/inh inhalation aerosol: See Instructions, Instructions: 4 puffs with chamber every 4 hours as needed, # 1 EA, 1 Refill(s), Type: Maintenance, [facility], 4 puffs with chamber every 4 hours as needed  chamber with valve and mouthpiece such as aerochamber: chamber with valve and mouthpiece such as aerochamber, See Instructions, Instructions: Use with albuterol, Supply, # 1 EA, 0 Refill(s), Type: Maintenance, Pharmacy: [id number], Use with albuterol   Problem list:    All Problems  History of chicken pox / SNOMED CT [id number] / Confirmed      Histories   Past Medical History:    Active  History of chicken pox ([id number])   Family History:    Anxiety....  Sister (Marcella)     Procedure history:    Duodenal stenosis ([id number]).  Comments:  2019 11:26 AM CDT - Laina Staton  as an infant  Extraction of wisdom tooth  ([id number]).  Comments:  5/15/2019 8:43 AM CDT - Maddie Warren  x4      Physical Examination   Vital Signs   8/8/2019 8:52 AM CDT Temperature Tympanic 97.9 DegF    Peripheral Pulse Rate 64 bpm    Systolic Blood Pressure 102 mmHg    Diastolic Blood Pressure 60 mmHg    Mean Arterial Pressure 74 mmHg      Measurements from flowsheet : Measurements   8/8/2019 8:52 AM CDT Height Measured - Standard 66 in    Weight Measured - Standard 142.8 lb    BSA 1.73 m2    Body Mass Index 23.05 kg/m2      General:  No acute distress.    HENT:  Tympanic membranes are clear, No pharyngeal erythema, No sinus tenderness, nares are patent, cannot do Valsalva manuever to get ears to pop.    Neck:  Supple, Non-tender, No lymphadenopathy.    Respiratory:  Lungs are clear to auscultation.       Impression and Plan   Diagnosis     Eustachian tube dysfunction (LTT27-TW H69.80).     Summary:  will treat with atrovent nasal spray and with OTC tylenol sinus, follow up if not improving.    Orders     Orders   Pharmacy:  Atrovent 42 mcg/inh nasal spray (Prescribe): 2 spray(s), nasal, qid, PRN: for nasal congestion, # 1 EA, 2 Refill(s), Type: Maintenance, [facility], 2 spray(s) Nasal qid,PRN:for nasal congestion.     Orders   Charges (Evaluation and Management):  [id number] office outpatient visit 15 minutes (Charge) (Order): Quantity: 1, Eustachian tube dysfunction.

## 2022-02-15 NOTE — NURSING NOTE
Comprehensive Intake Entered On:  9/18/2019 8:09 AM CDT    Performed On:  9/18/2019 8:02 AM CDT by Marcella Cano RN               Summary   Chief Complaint :   Here for f/u chest and bilat l>r Using nasal spray on occasion.     Last Menstrual Period :   9/14/2019 CDT   Menstrual Status :   Menarcheal   Weight Measured :   142 lb(Converted to: 142 lb 0 oz, 64.41 kg)    Height Measured :   67 in(Converted to: 5 ft 7 in, 170.18 cm)    Body Mass Index :   22.24 kg/m2   Body Surface Area :   1.74 m2   Height/Length Estimated :   67 in(Converted to: 5 ft 7 in, 170.18 cm)    Systolic Blood Pressure :   122 mmHg   Diastolic Blood Pressure :   82 mmHg (HI)    Mean Arterial Pressure :   95 mmHg   Peripheral Pulse Rate :   76 bpm   Temperature Tympanic :   97.5 DegF(Converted to: 36.4 DegC)  (LOW)    Marcella Cano RN - 9/18/2019 8:02 AM CDT   Health Status   Allergies Verified? :   Yes   Medication History Verified? :   Yes   Medical History Verified? :   Yes   Pre-Visit Planning Status :   Completed   Consents Current :   Yes   Planning a Pregnancy? :   No   Tobacco Use? :   Current some day smoker   Tobacco Cessation Review :   Advised to quit   Marcella Cano RN - 9/18/2019 8:02 AM CDT   Consents   Consent for Immunization Exchange :   Consent Granted   Consent for Immunizations to Providers :   Consent Granted   Marcella Cano RN - 9/18/2019 8:02 AM CDT

## 2022-02-15 NOTE — NURSING NOTE
Comprehensive Intake Entered On:  9/27/2019 3:40 PM CDT    Performed On:  9/27/2019 3:36 PM CDT by Sabine Zheng               Summary   Chief Complaint :   Pt c/o cough from flu shot.   Menstrual Status :   Menarcheal   Weight Measured :   144 lb(Converted to: 144 lb 0 oz, 65.32 kg)    Height Measured :   67 in(Converted to: 5 ft 7 in, 170.18 cm)    Body Mass Index :   22.55 kg/m2   Body Surface Area :   1.76 m2   Height/Length Estimated :   67 in(Converted to: 5 ft 7 in, 170.18 cm)    Systolic Blood Pressure :   124 mmHg   Diastolic Blood Pressure :   66 mmHg   Mean Arterial Pressure :   85 mmHg   Peripheral Pulse Rate :   76 bpm   Sabine Zheng - 9/27/2019 3:36 PM CDT   Temperature Tympanic :   100.4 DegF(Converted to: 38.0 DegC)    Sabine Zheng - 9/27/2019 4:11 PM CDT     Oxygen Saturation :   98 %   Sabine Zheng - 9/27/2019 3:36 PM CDT   Health Status   Allergies Verified? :   Yes   Medication History Verified? :   Yes   Medical History Verified? :   Yes   Pre-Visit Planning Status :   Completed   Tobacco Use? :   Current some day smoker   Sabine Zheng - 9/27/2019 3:36 PM CDT   Meds / Allergies   (As Of: 9/27/2019 3:40:52 PM CDT)   Allergies (Active)   No Known Medication Allergies  Estimated Onset Date:   Unspecified ; Created By:   Yen Castellon CMA; Reaction Status:   Active ; Category:   Drug ; Substance:   No Known Medication Allergies ; Type:   Allergy ; Updated By:   Yen Castellon CMA; Reviewed Date:   9/27/2019 3:40 PM CDT        Medication List   (As Of: 9/27/2019 3:40:52 PM CDT)   Prescription/Discharge Order    Miscellaneous Rx Supply  :   Miscellaneous Rx Supply ; Status:   Prescribed ; Ordered As Mnemonic:   chamber with valve and mouthpiece such as aerochamber ; Simple Display Line:   See Instructions, Use with albuterol, 1 EA, 0 Refill(s) ; Ordering Provider:   Tamika Zuluaga MD; Catalog Code:   Miscellaneous Rx Supply ; Order Dt/Tm:    11/17/2015 3:20:20 PM          ethinyl estradiol-etonogestrel  :   ethinyl estradiol-etonogestrel ; Status:   Prescribed ; Ordered As Mnemonic:   NuvaRing 0.120 mg-0.015 mg/24 hours vaginal ring ; Simple Display Line:   See Instructions, cycle continuously replacing every 4 weeks, 3 EA, 4 Refill(s) ; Ordering Provider:   Nadiya Ely; Catalog Code:   ethinyl estradiol-etonogestrel ; Order Dt/Tm:   5/8/2019 11:20:34 AM          albuterol  :   albuterol ; Status:   Prescribed ; Ordered As Mnemonic:   albuterol 90 mcg/inh inhalation aerosol ; Simple Display Line:   See Instructions, 4 puffs with chamber every 4 hours as needed, 1 EA, 1 Refill(s) ; Ordering Provider:   Nadiya Ely; Catalog Code:   albuterol ; Order Dt/Tm:   5/8/2019 11:21:05 AM          ipratropium nasal  :   ipratropium nasal ; Status:   Prescribed ; Ordered As Mnemonic:   Atrovent 42 mcg/inh nasal spray ; Simple Display Line:   2 spray(s), nasal, qid, PRN: for nasal congestion, 1 EA, 2 Refill(s) ; Ordering Provider:   Craig Aguilera PA-C; Catalog Code:   ipratropium nasal ; Order Dt/Tm:   8/8/2019 9:02:27 AM

## 2022-02-15 NOTE — TELEPHONE ENCOUNTER
---------------------From: Magaly Hagan To: [facility] Message Pool ([id number]);   Sent: 1/23/2019 3:53:26 PM CSTSubject: PA?  Phone MessagePCP NCBTime of Call  3:45Person Calling Kb @  Fresh Phone number [phone]  _ Kb left message again states that pt insurance will cover only one ring per 28 days, can't do every 3 weeks without a PA.  Let Kb know how you want to proceed, want to change or get PA.Last office visit and reason _1/22/19 FP plus---------------------From: Laina Staton ([facility]B Message Pool ([facility])) To: Nadiya Ely;   Sent: 1/23/2019 4:08:36 PM CSTSubject: FW: PA?---------------------From: Nadiya Ely To: [facility] Message Pool ([id number]);   Sent: 1/23/2019 4:38:07 PM CSTSubject: RE: PA? [facility] to initiate PA.PA was filled out and faxed through [email or web address] plan to check status- PA denied, will only cover 1 ring for every 28 days.---------------------From: Laina Statno ([facility] Message Pool ([id number])) To: Nadiya Ely;   Sent: 2/1/2019 10:49:18 AM CSTSubject: FW: PA?---------------------From: Nadiya Ely To: [facility]B Message Pool ([id number]);   Sent: 2/1/2019 10:52:30 AM CSTSubject: RE: PA? please let her know. She can keep the ring in for 28 days (4 weeks) then swap it out the same day she takes it out with a new one and this may be enough to stop the heavy periods.  other Option is to leave in 21 days, then remove, discard and insert new ring on day 28. SHe is due for annual exam after 3/20 and can schedule f/u with me then, thanks.sent new Rx and informed pharmacy and pt by phone call.

## 2022-02-15 NOTE — NURSING NOTE
Comprehensive Intake Entered On:  1/22/2019 1:08 PM CST    Performed On:  1/22/2019 1:00 PM CST by Laina Staton               Summary   Chief Complaint :   c/o irregular menses -took out NuvaRing last wednesday like normal and then on Thursday when she started her menses she states there was a big blood clot and then normal after that. Menses ended yesterday.   Last Menstrual Period :   1/17/2019 CST   Menstrual Status :   Menarcheal   Weight Measured :   160.0 lb(Converted to: 160 lb 0 oz, 72.57 kg)    Height Measured :   66 in(Converted to: 5 ft 6 in, 167.64 cm)    Body Mass Index :   25.82 kg/m2 (HI)    Body Surface Area :   1.84 m2   Systolic Blood Pressure :   120 mmHg   Diastolic Blood Pressure :   74 mmHg   Mean Arterial Pressure :   89 mmHg   Peripheral Pulse Rate :   60 bpm   BP Site :   Right arm   Pulse Site :   Radial artery   BP Method :   Manual   HR Method :   Manual   Temperature Tympanic :   97.0 DegF(Converted to: 36.1 DegC)  (LOW)    Laina Staton - 1/22/2019 1:00 PM CST   Health Status   Allergies Verified? :   Yes   Medication History Verified? :   Yes   Medical History Verified? :   Yes   Pre-Visit Planning Status :   Completed   Tobacco Use? :   Never smoker   Laina Staton - 1/22/2019 1:00 PM CST   Consents   Consent for Immunization Exchange :   Consent Granted   Consent for Immunizations to Providers :   Consent Granted   Laina Staton - 1/22/2019 1:00 PM CST   Meds / Allergies   (As Of: 1/22/2019 1:08:32 PM CST)   Allergies (Active)   No Known Medication Allergies  Estimated Onset Date:   Unspecified ; Created By:   Yen Castellon CMA; Reaction Status:   Active ; Category:   Drug ; Substance:   No Known Medication Allergies ; Type:   Allergy ; Updated By:   Yen Castellon CMA; Reviewed Date:   3/20/2018 12:02 PM CDT        Medication List   (As Of: 1/22/2019 1:08:32 PM CST)   Prescription/Discharge Order    ethinyl estradiol-etonogestrel  :   ethinyl  estradiol-etonogestrel ; Status:   Prescribed ; Ordered As Mnemonic:   NuvaRing 0.120 mg-0.015 mg/24 hours vaginal ring ; Simple Display Line:   See Instructions, INSERT 1 RING VAGINALLY ONCE EVERY 4 WEEKS, 3 EA, 4 Refill(s) ; Ordering Provider:   Nadiya Ely; Catalog Code:   ethinyl estradiol-etonogestrel ; Order Dt/Tm:   3/20/2018 12:20:43 PM          albuterol  :   albuterol ; Status:   Prescribed ; Ordered As Mnemonic:   albuterol 90 mcg/inh inhalation aerosol ; Simple Display Line:   See Instructions, 4 puffs with chamber every 4 hours as needed, 1 EA, 1 Refill(s) ; Ordering Provider:   Nadiya Ely; Catalog Code:   albuterol ; Order Dt/Tm:   3/24/2017 11:29:08 AM          Miscellaneous Rx Supply  :   Miscellaneous Rx Supply ; Status:   Prescribed ; Ordered As Mnemonic:   chamber with valve and mouthpiece such as aerochamber ; Simple Display Line:   See Instructions, Use with albuterol, 1 EA, 0 Refill(s) ; Ordering Provider:   Tamika Zuluaga MD; Catalog Code:   Miscellaneous Rx Supply ; Order Dt/Tm:   11/17/2015 3:20:20 PM

## 2022-02-15 NOTE — NURSING NOTE
Comprehensive Intake Entered On:  7/18/2019 2:35 PM CDT    Performed On:  7/18/2019 2:32 PM CDT by Laina Staton               Summary   Chief Complaint :   c/o jaw pain x1 year, worse in the past 2 months, itchy scalp, and L breast discharge x1 week.   Last Menstrual Period :   7/17/2019 CDT   Menstrual Status :   Menarcheal   Weight Measured :   139.6 lb(Converted to: 139 lb 10 oz, 63.32 kg)    Height Measured :   66 in(Converted to: 5 ft 6 in, 167.64 cm)    Body Mass Index :   22.53 kg/m2   Body Surface Area :   1.72 m2   Systolic Blood Pressure :   120 mmHg   Diastolic Blood Pressure :   78 mmHg   Mean Arterial Pressure :   92 mmHg   Peripheral Pulse Rate :   76 bpm   Temperature Tympanic :   97.1 DegF(Converted to: 36.2 DegC)  (LOW)    Laina Staton - 7/18/2019 2:32 PM CDT   Health Status   Allergies Verified? :   Yes   Medication History Verified? :   Yes   Medical History Verified? :   Yes   Pre-Visit Planning Status :   Completed   Tobacco Use? :   Never smoker   Laina Staton - 7/18/2019 2:32 PM CDT   Consents   Consent for Immunization Exchange :   Consent Granted   Consent for Immunizations to Providers :   Consent Granted   Laina Staton - 7/18/2019 2:32 PM CDT   Meds / Allergies   (As Of: 7/18/2019 2:35:51 PM CDT)   Allergies (Active)   No Known Medication Allergies  Estimated Onset Date:   Unspecified ; Created By:   Yen Castellon CMA; Reaction Status:   Active ; Category:   Drug ; Substance:   No Known Medication Allergies ; Type:   Allergy ; Updated By:   Yen Castellon CMA; Reviewed Date:   7/18/2019 2:35 PM CDT        Medication List   (As Of: 7/18/2019 2:35:51 PM CDT)   Prescription/Discharge Order    albuterol  :   albuterol ; Status:   Prescribed ; Ordered As Mnemonic:   albuterol 90 mcg/inh inhalation aerosol ; Simple Display Line:   See Instructions, 4 puffs with chamber every 4 hours as needed, 1 EA, 1 Refill(s) ; Ordering Provider:   Nadiya Ely; Catalog Code:    albuterol ; Order Dt/Tm:   5/8/2019 11:21:05 AM          ethinyl estradiol-etonogestrel  :   ethinyl estradiol-etonogestrel ; Status:   Prescribed ; Ordered As Mnemonic:   NuvaRing 0.120 mg-0.015 mg/24 hours vaginal ring ; Simple Display Line:   See Instructions, cycle continuously replacing every 4 weeks, 3 EA, 4 Refill(s) ; Ordering Provider:   Nadiya Ely; Catalog Code:   ethinyl estradiol-etonogestrel ; Order Dt/Tm:   5/8/2019 11:20:34 AM          Miscellaneous Rx Supply  :   Miscellaneous Rx Supply ; Status:   Prescribed ; Ordered As Mnemonic:   chamber with valve and mouthpiece such as aerochamber ; Simple Display Line:   See Instructions, Use with albuterol, 1 EA, 0 Refill(s) ; Ordering Provider:   Tamika Zuluaga MD; Catalog Code:   Miscellaneous Rx Supply ; Order Dt/Tm:   11/17/2015 3:20:20 PM

## 2022-02-15 NOTE — NURSING NOTE
Asthma Control Test (ACT) Total Entered On:  9/18/2019 9:09 AM CDT    Performed On:  9/18/2019 9:09 AM CDT by Mena Jackson LPN               Asthma Control Test (ACT) Total   Asthma Control Test Total (Adult) :   24    Asthma Action Plan Provided? :   Yes   Mena Jackson LPN - 9/18/2019 9:09 AM CDT

## 2022-02-15 NOTE — PROGRESS NOTES
Patient:   BARBI MENDEZ            MRN: [id number]            FIN: [id number]               Age:   21 years     Sex:  Female     :  1995   Associated Diagnoses:   Flu-like symptoms; Post viral RAD (reactive airway disease)   Author:   Nadiya Ely      Visit Information      Date of Service: 2017 11:08 am  Performing Location: [facility]  Encounter#: [id number]      Primary Care Provider (PCP):  Not recorded.      Referring Provider:  No referring provider recorded for selected visit.      Chief Complaint   3/24/2017 11:13 AM CDT   c/o fever, cough, body aches, diarrhea since Monday        History of Present Illness   Confirmed symptoms and concerns with patient as presented in CC above.[employer] student here with c/o diarrhea and low grade temp 5 days ago then onset 103 fever yesterday with cough and sore throat. Her mom believes she has 'two things going'. SHe has a hx of RAD, has not beenusing albuterol as lost it. Her mom told her to ask for some prednisone beause she will need this when she gets a respiratory infection. She had spoken with nurse line about influenza, did not have flu shot, not interested in Tamiflu and doesn't feel it would be worth taking. Actually feels a bit better today. Last took ibuprofen 6 hours ago. GOod po intake      Review of Systems   Constitutional:  Fever, Chills, No sweats.    Ear/Nose/Mouth/Throat:  Sore throat, No ear pain, No nasal congestion.    Respiratory:  Cough, No shortness of breath, No wheezing.    Gastrointestinal:  No nausea, No vomiting.             Health Status   Allergies:    Allergic Reactions (Selected)  No Known Medication Allergies   Medications:  (Selected)   Prescriptions  Prescribed  Guiatuss AC 10 mg-100 mg/5 mL oral syrup: 10 mL, PO, q4hr, PRN: for cough, # 120 mL, 0 Refill(s), Type: Maintenance, [facility], 10 mL po q4 hrs,x3 day(s),PRN:for cough  NuvaRing 0.120 mg-0.015 mg/24 hours  vaginal rin EA, vag, q4 wks, # 2 EA, 10 Refill(s), Type: Maintenance, Pharmacy: [facility], 1 EA vag q4 wks  albuterol 90 mcg/inh inhalation aerosol: See Instructions, Instructions: 4 puffs with chamber every 4 hours as needed, # 1 EA, 1 Refill(s), Type: Maintenance, Pharmacy: [facility], 4 puffs with chamber every 4 hours as needed  chamber with valve and mouthpiece such as aerochamber: chamber with valve and mouthpiece such as aerochamber, See Instructions, Instructions: Use with albuterol, Supply, # 1 EA, 0 Refill(s), Type: Maintenance, [id number], Use with albuterol  predniSONE 10 mg oral tablet: 1 tab(s) ( 10 mg ), PO, Daily, # 5 tab(s), 0 Refill(s), Type: Maintenance, Pharmacy: [facility], 1 tab(s) po daily,x5 day(s)   Problem list:    No problem items selected or recorded.      Histories   Past Medical History:    No active or resolved past medical history items have been selected or recorded.   Family History:    Anxiety....  Sister (Marcella)     Procedure history:    No active procedure history items have been selected or recorded.   Social History:        Alcohol Assessment: Denies Alcohol Use            Never      Tobacco Assessment            Never smoker      Substance Abuse Assessment: Denies Substance Abuse            Never      Exercise and Physical Activity Assessment: Does not exercise      Sexual Assessment            Sexually active: Yes.  Sexual orientation: Heterosexual.        Physical Examination   Vital Signs   3/24/2017 11:13 AM CDT Temperature Tympanic 99.2 DegF    Peripheral Pulse Rate 74 bpm    Pulse Site Radial artery    HR Method Manual    Systolic Blood Pressure 118 mmHg    Diastolic Blood Pressure 78 mmHg    Mean Arterial Pressure 91 mmHg    BP Site Right arm    BP Method Manual      Measurements from flowsheet : Measurements   3/24/2017 11:13 AM CDT Height Measured - Standard  66 in    Weight Measured - Standard 160.2 lb    BSA 1.84 m2    Body Mass Index 25.85 kg/m2      General:  Alert and oriented, No acute distress.    Eye:  Normal conjunctiva.    HENT:  Tympanic membranes are clear, Normal hearing, Oral mucosa is moist, No pharyngeal erythema, No sinus tenderness.    Neck:  Supple, Non-tender, No lymphadenopathy.    Respiratory:  Lungs are clear to auscultation, Respirations are non-labored, Breath sounds are equal, Symmetrical chest wall expansion, some courseness in the upper fields.    Cardiovascular:  Normal rate, Regular rhythm, No murmur.    Musculoskeletal:  Normal range of motion, Normal gait.    Integumentary:  Warm, Dry, Pink, No rash.    Neurologic:  Alert, Oriented.    Psychiatric:  Cooperative.       Impression and Plan   Diagnosis     Flu-like symptoms (LTF37-LS R68.89).     Post viral RAD (reactive airway disease) ([id number] J45.998).     Patient Instructions:       Counseled: Patient, Regarding diagnosis, Regarding treatment, Regarding medications, Verbalized understanding, Counseled on symptomatic management. Return to clinic for re evaluation if worsening, simply not improving, or failure to resolve.   , burst presnidsone  start albuterol and cough syrup,   rtc if worsening in anyway.    Orders     Orders (Selected)   Prescriptions  Prescribed  Guiatuss AC 10 mg-100 mg/5 mL oral syrup: 10 mL, PO, q4hr, PRN: for cough, # 120 mL, 0 Refill(s), Type: Maintenance, Pharmacy: [facility], 10 mL po q4 hrs,x3 day(s),PRN:for cough  albuterol 90 mcg/inh inhalation aerosol: See Instructions, Instructions: 4 puffs with chamber every 4 hours as needed, # 1 EA, 1 Refill(s), Type: Maintenance, Pharmacy: [facility], 4 puffs with chamber every 4 hours as needed  predniSONE 10 mg oral tablet: 1 tab(s) ( 10 mg ), PO, Daily, # 5 tab(s), 0 Refill(s), Type: Maintenance, Pharmacy: [facility], 1 tab(s) po daily,x5  day(s).